# Patient Record
Sex: FEMALE | Race: ASIAN | NOT HISPANIC OR LATINO
[De-identification: names, ages, dates, MRNs, and addresses within clinical notes are randomized per-mention and may not be internally consistent; named-entity substitution may affect disease eponyms.]

---

## 2024-06-09 ENCOUNTER — NON-APPOINTMENT (OUTPATIENT)
Age: 44
End: 2024-06-09

## 2024-06-10 ENCOUNTER — APPOINTMENT (OUTPATIENT)
Dept: CARDIOLOGY | Facility: CLINIC | Age: 44
End: 2024-06-10
Payer: COMMERCIAL

## 2024-06-10 VITALS
HEIGHT: 61.42 IN | HEART RATE: 72 BPM | BODY MASS INDEX: 20.13 KG/M2 | SYSTOLIC BLOOD PRESSURE: 103 MMHG | DIASTOLIC BLOOD PRESSURE: 68 MMHG | OXYGEN SATURATION: 98 % | RESPIRATION RATE: 16 BRPM | WEIGHT: 108 LBS

## 2024-06-10 DIAGNOSIS — R00.2 PALPITATIONS: ICD-10-CM

## 2024-06-10 DIAGNOSIS — Z78.9 OTHER SPECIFIED HEALTH STATUS: ICD-10-CM

## 2024-06-10 PROBLEM — Z00.00 ENCOUNTER FOR PREVENTIVE HEALTH EXAMINATION: Status: ACTIVE | Noted: 2024-06-10

## 2024-06-10 PROCEDURE — 93306 TTE W/DOPPLER COMPLETE: CPT

## 2024-06-10 PROCEDURE — 99204 OFFICE O/P NEW MOD 45 MIN: CPT

## 2024-06-12 PROBLEM — Z78.9 CAFFEINE USE: Status: ACTIVE | Noted: 2024-06-12

## 2024-06-12 NOTE — HISTORY OF PRESENT ILLNESS
[FreeTextEntry1] : 6/10/24 -  Please refer to NP note below. 44-year-old female with PMH of HLD and migraine, presents for evaluation of palpitation. Pt reports frequent palpitation for 2 weeks, usually during mealtime, describes as fast and irregular, lasting for 30-60 minutes. Pt also reports bilateral eyelids swelling, redness, and itchiness together with palpitations. Pt denies any belching. Pt denies CP, SOB, or lightheadedness. Pt denies h/o syncope. Pt has drinking coffees 1-2 cups/ day. Today's /68 P 72. Pt is on meds for migraine.  I advised patient to undergo an echocardiogram. I advised patient to wear 7 day event monitor.

## 2024-06-25 ENCOUNTER — NON-APPOINTMENT (OUTPATIENT)
Age: 44
End: 2024-06-25

## 2024-06-25 RX ORDER — METOPROLOL SUCCINATE 25 MG/1
25 TABLET, EXTENDED RELEASE ORAL DAILY
Qty: 45 | Refills: 1 | Status: ACTIVE | COMMUNITY
Start: 2024-06-25 | End: 1900-01-01